# Patient Record
Sex: MALE | Race: WHITE | NOT HISPANIC OR LATINO | Employment: FULL TIME | URBAN - METROPOLITAN AREA
[De-identification: names, ages, dates, MRNs, and addresses within clinical notes are randomized per-mention and may not be internally consistent; named-entity substitution may affect disease eponyms.]

---

## 2017-12-29 ENCOUNTER — TREATMENT (OUTPATIENT)
Dept: PHYSICAL THERAPY | Facility: CLINIC | Age: 62
End: 2017-12-29

## 2017-12-29 DIAGNOSIS — M72.0 DUPUYTREN'S CONTRACTURE OF RIGHT HAND: Primary | ICD-10-CM

## 2017-12-29 PROCEDURE — L3925 FO PIP DIP JNT/SPRNG PRE OTS: HCPCS | Performed by: PHYSICAL THERAPIST

## 2017-12-29 NOTE — PROGRESS NOTES
Hand Therapy Splint Evaluation and Plan of Treatment    Patient Name: Devan Louis       Patient MRN: [id number]  : 1955  Physician: Wade Leung MD  Date: 2017  Encounter Diagnoses   Name Primary?   • Dupuytren's contracture of right hand Yes       Subjective: Patient is a L hand dominant male with  Dupuytren's Ds affecting the SF. Patient s/p Xiaflex injection in the R hand on 17 with manipulation today.   Objective: AROM: R SF PIP ext 20 deg from neutral, Sensation is normal. Strength NA secondary to manipulation today. Edema present in dorsal and palmar hand.     Splinting:  [x] Patient was measured and fit with a prefabricated Capener splint  [x] Patient was instructed in wearing schedule, precautions and care of the splint during this visit  [x] Patient was instructed in proper donning/doffing of splint    Assessment:  [x] Patient was fitted and appropriate splint was fabricated this date  [x] Patient reported that splint was comfortable and had no complications with the fit of splint  [x] Patient was instructed and patient verbalized understanding of precautions, wear and care of the splint  [x] Patient demonstrated independent donning and doffing of splint during treatment today    Goals:  [x] Patient was fitted properly with appropriate splint for diagnosis  [x] Patient was educated on precautions, wear schedule and care of the splint  [x] Patient demonstrated independence with donning and doffing of splint  [x] Splint was provide to [] protect healing structures [x] increase ROM []  Restrict mobility []  Improve joint alignment      Plan:  [x] No additional treatment is required for this patient at this time. The patient is therefore discharged from therapy.  [x] Patient advised to contact therapist with any additional questions or concerns regarding the fit and function of the splint  [x] Patient will be seen for fitting, adjustments, training in precautions, wear or care  of splint    PT SIGNATURE: Dilcia Najera, PT, DPT  DATE TREATMENT INITIATED: 12/29/2017    Initial Certification    I certify that the therapy services are furnished while this patient is under my care.  The services outlined above are required by this patient, and will be reviewed every 90 days.     PHYSICIAN:      DATE:     Please sign and return via fax to [phone].. Thank you, [facility].

## 2018-01-24 ENCOUNTER — TRANSCRIBE ORDERS (OUTPATIENT)
Dept: PHYSICAL THERAPY | Facility: CLINIC | Age: 63
End: 2018-01-24

## 2018-01-24 DIAGNOSIS — M79.642 PAIN OF LEFT HAND: Primary | ICD-10-CM

## 2021-03-08 ENCOUNTER — IMMUNIZATION (OUTPATIENT)
Dept: VACCINE CLINIC | Facility: HOSPITAL | Age: 66
End: 2021-03-08

## 2021-03-08 PROCEDURE — 0001A: CPT | Performed by: INTERNAL MEDICINE

## 2021-03-08 PROCEDURE — 91300 HC SARSCOV02 VAC 30MCG/0.3ML IM: CPT | Performed by: INTERNAL MEDICINE

## 2021-03-29 ENCOUNTER — IMMUNIZATION (OUTPATIENT)
Dept: VACCINE CLINIC | Facility: HOSPITAL | Age: 66
End: 2021-03-29

## 2021-03-29 PROCEDURE — 0002A: CPT | Performed by: INTERNAL MEDICINE

## 2021-03-29 PROCEDURE — 91300 HC SARSCOV02 VAC 30MCG/0.3ML IM: CPT | Performed by: INTERNAL MEDICINE

## 2021-10-05 ENCOUNTER — IMMUNIZATION (OUTPATIENT)
Dept: VACCINE CLINIC | Facility: HOSPITAL | Age: 66
End: 2021-10-05

## 2021-10-05 ENCOUNTER — APPOINTMENT (OUTPATIENT)
Dept: VACCINE CLINIC | Facility: HOSPITAL | Age: 66
End: 2021-10-05

## 2021-10-05 PROCEDURE — 91300 HC SARSCOV02 VAC 30MCG/0.3ML IM: CPT | Performed by: INTERNAL MEDICINE

## 2021-10-05 PROCEDURE — 0004A ADM SARSCOV2 30MCG/0.3ML BOOSTER: CPT | Performed by: INTERNAL MEDICINE

## 2021-10-05 PROCEDURE — 0003A: CPT | Performed by: INTERNAL MEDICINE
